# Patient Record
Sex: FEMALE | Race: NATIVE HAWAIIAN OR OTHER PACIFIC ISLANDER
[De-identification: names, ages, dates, MRNs, and addresses within clinical notes are randomized per-mention and may not be internally consistent; named-entity substitution may affect disease eponyms.]

---

## 2019-03-04 ENCOUNTER — HOSPITAL ENCOUNTER (OUTPATIENT)
Dept: HOSPITAL 76 - DI | Age: 64
Discharge: HOME | End: 2019-03-04
Attending: FAMILY MEDICINE
Payer: MEDICARE

## 2019-03-04 DIAGNOSIS — R01.1: Primary | ICD-10-CM

## 2019-03-04 PROCEDURE — 93306 TTE W/DOPPLER COMPLETE: CPT

## 2022-04-07 ENCOUNTER — HOSPITAL ENCOUNTER (OUTPATIENT)
Dept: HOSPITAL 76 - SDS | Age: 67
Discharge: HOME | End: 2022-04-07
Attending: OPHTHALMOLOGY
Payer: MEDICARE

## 2022-04-07 VITALS — DIASTOLIC BLOOD PRESSURE: 74 MMHG | SYSTOLIC BLOOD PRESSURE: 115 MMHG

## 2022-04-07 DIAGNOSIS — H25.811: ICD-10-CM

## 2022-04-07 DIAGNOSIS — E11.36: Primary | ICD-10-CM

## 2022-04-07 DIAGNOSIS — Z79.84: ICD-10-CM

## 2022-04-07 PROCEDURE — 66984 XCAPSL CTRC RMVL W/O ECP: CPT

## 2022-04-07 NOTE — OPERATIVE REPORT
Operative Report





- Other


Other Information/Narrative: 





Date of Surgery: 04/07/22


Preop Dx: Visually significant cataract right eye. This was the first cataract 

surgery.


Postop Dx: Same


Procedure: Phacoemulsification with posterior chamber toric intraocular lens 

implant right eye


Surgeon: Dr. Huy Owen


Anesthesia: Monitored anesthesia care


Complications: None


Operative Indications: This is a 67-year-old F with progressive vision loss in 

the right eye due to 2+ nuclear sclerotic, 2-3+ cortical and vacuolar cataract. 

 Best corrected visual acuity was 20/40 with glare to 20/60 vision in the right 

eye.  Indications for surgery were:


-   Overall decrease in vision


-   Difficulty seeing words on a computer screen


-   Difficulty reading


-   Difficulty seeing words, closed captions, or game scores on TV


-   Difficulty driving in low light or at night


-   Difficulty driving at night because of headlights from other vehicles


-   Difficulty with glare or bright lights in any situation


The patient was consented at length concerning the risks and benefits of 

cataract surgery after which the patient expressed a desire to proceed with 

surgery.  





Operative Procedure:  The patients cornea was marked in the pre-surgical area 

to indicate the axis for the toric intraocular lens. The patient was taken into 

OR#3 and placed under monitored anesthesia care.  A surgical time-out was 

conducted confirming correct patient, correct procedure, and correct surgical 

site.  The patient was given topical anesthesia and then prepped and draped in 

the usual sterile fashion.  The eye was entered at the 6 and 3 oclock 

positions.  Intracameral Shugarcaine was injected into the anterior chamber 

followed by a dispersive viscoelastic. A continuous-tear curvilinear caps

ulorhexis was performed. The nucleus was hydrodissected and phacoemulsified.  

The cortex was evacuated using automated infusion and aspiration.  A cohesive 

viscoelastic was injected into the capsular bag and a 25.5 diopter toric 

intraocular lens was inserted into the bag and rotated to axis 076. Infusion and

aspiration were used to evacuate the viscoelastic materials from the eye and the

IOL was verified to remain on axis. The wounds were hydrated and the eye 

inflated to physiologic pressure using balanced salt solution.  Approximately 

0.25ml of a mixture of triamcinolone and moxifloxacin was injected trans-

sclerally into the vitreous in the inferotemporal quadrant using a 30 gauge 

cannula.  An additional 0.55ml of a mixture of triamcinolone, moxifloxacin, and 

vancomycin was injected subconjunctivally in the superior quadrant for infection

and inflammation prophylaxis. Wound integrity was checked with Weck-Freya sponges 

and the IOL axis was once again verified to be on the correct axis.  The patient

was taken from the operating room in good condition and given post-op 

instructions.

## 2022-04-07 NOTE — ANESTHESIA POST OP EVALUATION
Anesthesia Post Eval





- Post Anesthesia Eval


Vitals: 





                                Last Vital Signs











Temp  36.4 C L  04/07/22 08:41


 


Pulse  81   04/07/22 08:41


 


Resp  16   04/07/22 08:41


 


BP  115/74   04/07/22 08:41


 


Pulse Ox  96   04/07/22 08:41











CV Function Including HR & BP: Stable


Pain Control: Satisfactory


Nausea & Vomiting: Negative


Mental Status: Baseline


Respiratory Status: Airway Patent


Hydration Status: Satisfactory


Anesthesia Complications: None

## 2022-04-07 NOTE — ANESTHESIA
Pre-Anesthesia VS, & Labs





- Diagnosis





R cataract





- Procedure





R PhacoIOL


Vital Signs: 





                                        











Temp Pulse Resp BP Pulse Ox


 


 36.7 C   78   16   115/64   97 


 


 22 06:30  22 06:30  22 06:30  22 06:30  22 06:30











Height: 5 ft 4 in


Weight (kg): 61 kg


Body Mass Index: 23.1


BMI Classification: Healthy weight





- NPO


>8 hours





- Pregnancy


Is Patient Pregnant?: No





- Lab Results


Current Lab Results: 





Laboratory Tests





22 06:50: POC Whole Bld Glucose 102 H











Home Medications and Allergies


Home Medications: 


Ambulatory Orders





Atorvastatin [Lipitor] 20 mg PO DAILY 22 











                                        





Fexofenadine [Allegra] 180 mg DAILY 14 


Levothyroxine Sodium [Synthroid] 25 mcg DAILY 14 


Telmisartan [Micardis] 20 mg DAILY 14 


Atorvastatin [Lipitor] 20 mg PO DAILY 22 








Allergies/Adverse Reactions: 


                                    Allergies











Allergy/AdvReac Type Severity Reaction Status Date / Time


 


No Known Drug Allergies Allergy   Verified 22 06:48














Anes History & Medical History





- Anesthetic History


Anesthesia Complications: reports: No previous complications


Family history of Anesthesia Complications: Denies


Family history of Malignant Hyperthermia: Denies





- Medical History


Cardiovascular: reports: Hypertension, High cholesterol


Pulmonary: reports: None


Gastrointestinal: reports: None


Urinary: reports: None


Musculoskeletal: reports: None


Endocrine/Autoimmune: reports: Type 2 diabetes, HyPOthyroidism


Skin: reports: None


Smoking Status: Never smoker





- Surgical History


General: reports: Colonoscopy


Gynecologic: reports:  section


Orthopedic: reports: Knee replacement





Exam


General: Alert, Oriented x3


Mouth Opening: 3 Fingerbreadth


Neck Mobility: Normal


Mallampati classification: I


Thyromental Distance: 4-6 cm


Respiratory: Lungs clear


Cardiovascular: Regular rate





Plan


Anesthesia Type: MAC


Consent for Procedure(s) Verified and Reviewed: Yes


Code Status: Attempt Resuscitation


ASA classification: 2-Mild systemic disease


Is this case an emergency?: No